# Patient Record
Sex: FEMALE | Race: OTHER | HISPANIC OR LATINO | URBAN - METROPOLITAN AREA
[De-identification: names, ages, dates, MRNs, and addresses within clinical notes are randomized per-mention and may not be internally consistent; named-entity substitution may affect disease eponyms.]

---

## 2019-07-10 ENCOUNTER — EMERGENCY (EMERGENCY)
Facility: HOSPITAL | Age: 44
LOS: 1 days | Discharge: HOME | End: 2019-07-10
Attending: EMERGENCY MEDICINE
Payer: COMMERCIAL

## 2019-07-10 VITALS
TEMPERATURE: 99 F | WEIGHT: 149.91 LBS | DIASTOLIC BLOOD PRESSURE: 102 MMHG | SYSTOLIC BLOOD PRESSURE: 165 MMHG | OXYGEN SATURATION: 98 % | HEART RATE: 90 BPM | RESPIRATION RATE: 15 BRPM | HEIGHT: 65 IN

## 2019-07-10 PROCEDURE — 99053 MED SERV 10PM-8AM 24 HR FAC: CPT

## 2019-07-10 PROCEDURE — 99282 EMERGENCY DEPT VISIT SF MDM: CPT
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
## 2019-07-11 VITALS
HEART RATE: 79 BPM | OXYGEN SATURATION: 99 % | DIASTOLIC BLOOD PRESSURE: 86 MMHG | TEMPERATURE: 98 F | RESPIRATION RATE: 20 BRPM | SYSTOLIC BLOOD PRESSURE: 137 MMHG

## 2019-07-11 PROCEDURE — 99283 EMERGENCY DEPT VISIT LOW MDM: CPT

## 2019-07-11 PROCEDURE — T1013: CPT
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
## 2019-07-11 NOTE — ED PROVIDER NOTE - PHYSICAL EXAMINATION
General: In NAD, non-toxic appearing; well nourished/developed.  Eyes: Sclera anicteric, conjunctivae clear b/l. No discharge. PERRLA, EOMI.   Cardio: No lifts, heaves, visible pulsations. No thrills. Rate and rhythm regular, S1 & S2 clear. No audible murmur, gallop, or rub. Neck veins nondistended at 30 degrees. No carotid bruits b/l.  PV: Pulses: b/l 2+ radial, DP, PT. Capillary refill <2 seconds.  Resp: Speaking in full sentences. No visible nasal flaring, retractions, or deformity. Normal AP to lateral diameter, symmetrical excursion b/l. Breath sounds vesicular, symmetrical and without rales, rhonchi or wheezing b/l.  Abd: Non-distended. Soft, non-tender, no masses palpated. No rebound, guarding.  MSK/Neuro: A&Ox3, FROM. Strength 5/5 upper and lower extremities. Sensation intact to bilateral upper and lower extremities. Normal gait.
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
